# Patient Record
(demographics unavailable — no encounter records)

---

## 2024-10-18 NOTE — ASSESSMENT
[Diabetes Foot Care] : diabetes foot care [Long Term Vascular Complications] : long term vascular complications of diabetes [Carbohydrate Consistent Diet] : carbohydrate consistent diet [Importance of Diet and Exercise] : importance of diet and exercise to improve glycemic control, achieve weight loss and improve cardiovascular health [Exercise/Effect on Glucose] : exercise/effect on glucose [Hypoglycemia Management] : hypoglycemia management [Action and use of Insulin] : action and use of short and long-acting insulin [Self Monitoring of Blood Glucose] : self monitoring of blood glucose [Insulin Self-Administration] : insulin self-administration [Injection Technique, Storage, Sharps Disposal] : injection technique, storage, and sharps disposal [Retinopathy Screening] : Patient was referred to ophthalmology for retinopathy screening [Weight Loss] : weight loss [FreeTextEntry1] : PT WAS EDUCATED ON SIGNIFICANCE OF A1C OF 11.3% ON LONG- AND SHORT-TERM COMPLICATIONS  DISCUSSED GENERAL A1C GOAL OF 7% AND BG GOALS  BEFORE MEALS AND < 180 2 HOURS AFTER MEALS TO HELP REDUCE INCIDENCE OF COMPLICATIONS EXPLAINED THE PATHOPHYSIOLOGY OF TYPE 2 DIABETES AND TREATMENT.  EXPLAINED THAT PEOPLE OFTEN NEED FOR MULTIPLE AGENTS IN ADDITION TO LIFESTYLE CHANGES TO CONTROL BG AND HELP PREVENT COMPLICATIONS.  LABS REVIEWED INDEPTH INCLUDING LOW C-PEPTIDE AND THE NEED FOR INSULIN  PT IN AGREEMENT TO START BASAL INSULIN ONCE DAILY PT INSTRUCTED ON USE OF INSULIN PEN.  DISCUSSED IMPORTANCE OF 2 UNIT "TEST SHOT" BEFORE EVERY INJECTION AND IMPORTANCE OF HOLDING PEN IN PLACE FOR 10 SECONDS BEFORE REMOVING.  EDUCATED ON INSULIN STORAGE AND IMPORTANCE OF SITE ROTATION.   EDUCATED ON SIGNS, SYMPTOMS, PREVENTION AND TREATMENT OF HYPOGLYCEMIA.  EXPLAINED THAT HYPOGLYCEMIA SHOULD NOT BE TOLERATED AND SHOULD BE BROUGHT TO THE ATTENTION OF THE HEALTH CARE PROVIDER PROMPTLY.  INSTRUCTED TO TAKE AT APPROXIMATELY THE SAME TIME EACH DAY.  EXPLAINED THAT THIS IS A STARTING DOSE OF INSULIN AND WILL LIKELY BE INCREASED.  PT WAS ABLE TO SUCCESSFULLY INJECT  10   UNITS OF   TOUJEO  IN ABDOMEN.  TOUJEO SAMPLE GIVEN LOT# [id number] EXP: 3/31/25 PT AWARE TO COMPLETE TOUJEO SAMPLE PRIOR TO TAKING BASAGLAR (OR EQUIVILENT INSTRUCTED TO INCREASE INSULIN BY 2 UNTILS EVERY 5 DAYS UNTIL FASTING BG  < 150  SAMPLE DIANA 3 CGM PLACED ON  RIGHT UPPER ARM LOT# [id number]  SERIAL # [id number]   EXP: 1/31/25 INSTRUCTED TO AVOID MORE THAN 500 MG OF VITAMIN C (PT DOES NOT TAKE) INSTRUCTED TO REMOVE PRIOR TO MRI  EXPLAINED THAT CGM IS TESTING INTERSTITIAL FLUID, RATHER THAN THE CAPILLARY BLOOD MEASURED WHEN PT PERFORMS A FINGERSTICK INSTRUCTED TO VERIFY ANY RESULTS THAT SEEM INACCURATE OR IF HAVING FEELINGS OF LOW BG BY PERFORMING FINGERSTICK EDUCATED ON SIGNS AND SYMPTOMS OF SITE INFECTION AND IMPORTANCE OF REMOVING SENSOR AND CALLING OFFICE ASAP INSTRUCTED TO REMOVE SENSOR AFTER 14 DAYS.  SENSOR MAY BE DISCARDED.   .INSTRUCTED TO CALL OFFICE FOR BG < 70, > 250 OR FOR ANY QUESTIONS OR CONCERNS CALL OFFICE NEXT WEEK TO REVIEW CGM DOWNLOAD EXPLAINED TO PT THAT THIS IS A STARTING DOSE AND WILL LIKELY BE INCREASED AS WE SEE BG READINGS AND RESPONSE TO INSULIN  ENCOURAGED TO SHARE DIABETES DX WITH FAMILY.  MUCH EMOTIONAL SUPPORT PROVIDED  WILL CONSIDER ADDING GLP1 AT NEXT VISIT   PT WAS ABLE TO TEACH BACK ALL INSTRUCTIONS.  WRITTEN, ILLUSTRATED INSTRUCTIONS AND EDUCATIONAL MATERIALS GIVEN. SCHEDULED TO RETURN TO OFFICE ON 11/18/24

## 2024-10-18 NOTE — PHYSICAL EXAM
[Alert] : alert [No Acute Distress] : no acute distress [No Respiratory Distress] : no respiratory distress [Normal Gait] : normal gait [Right foot was examined, including] : right foot ~C was examined, including visual inspection with sensory and pulse exams [Normal] : normal [2+] : 2+ in the dorsalis pedis [Foot Ulcers] : no foot ulcers [Delayed in the Right Toes] : normal in the toes [Delayed in the Left Toes] : normal in the toes [Diminished Throughout Both Feet] : normal tactile sensation with monofilament testing throughout both feet

## 2024-10-18 NOTE — HISTORY OF PRESENT ILLNESS
[FreeTextEntry1] : HERE TODAY FOR DIABETES FOLLOW UP - LAST SEEN BY ME 11/27/27/24 FEELS WELL REPORTS POLYURIA (LESS IN LAST 2 DAYS SINCE TRYING TO IMPROVED DIET)   NO UNINTENTIONAL WEIGHT LOSS A1C 11.3% 10/6/24 HX GDM TYPE 2 DIABETES x 5 YEARS.  INITIALLY PRESENTED WITH A1C OF 14% , WENT ON INSULIN AND METFORMIN AND CAME DOWN TO 6.2% WITH INSULIN AND LIFESTYLE CHANGES. NOT SEEN AND NO LABS X 2 YEARS AFTER THAT.  4/14/23 .  A1C 12%   .REPORTS COMPLIANCE W/ METFORMIN 1,000 MG BEFORE BREAKFAST AND DINNER TOLERATING JARDIANCE. DENIES SIGN/SYMPTOMS OF UTI OR GENITAL INFECTION. 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

## 2024-10-18 NOTE — REVIEW OF SYSTEMS
[Polyuria] : polyuria [Polydipsia] : polydipsia [Fatigue] : no fatigue [Blurred Vision] : no blurred vision [Dysphagia] : no dysphagia [Shortness Of Breath] : no shortness of breath [Nausea] : no nausea [Diarrhea] : no diarrhea [Gas/Bloating] : no gas/bloating

## 2024-11-18 NOTE — ASSESSMENT
[Diabetes Foot Care] : diabetes foot care [Long Term Vascular Complications] : long term vascular complications of diabetes [Carbohydrate Consistent Diet] : carbohydrate consistent diet [Importance of Diet and Exercise] : importance of diet and exercise to improve glycemic control, achieve weight loss and improve cardiovascular health [Exercise/Effect on Glucose] : exercise/effect on glucose [Hypoglycemia Management] : hypoglycemia management [Retinopathy Screening] : Patient was referred to ophthalmology for retinopathy screening [Weight Loss] : weight loss [FreeTextEntry1] : BG MUCH IMPROVED DISCUSSED GENERAL A1C GOAL OF 7% AND BG GOALS  BEFORE MEALS AND < 180 2 HOURS AFTER MEALS TO HELP REDUCE INCIDENCE OF COMPLICATIONS  DISCUSSED WORKING PT UP FOR BOOGIE GIVEN SENSITIVITY TO FOODS, LOW C-PEPTIDE, BGELEVATIONS WITH EXERCISE.  PT IN AGREEMENT TO OBTAIN LABS THIS WEEK  LENGTHY DISCUSSION ON TIPS AND TECHNIQUES FOR CHOOSING SMALLER PORTIONS AND CHOOSING HIGHER QUALITY CARBOHYDRATES AND LEAN PROTEINS.  USE OF MEASURING CUPS, SMALLER PLATES AND EATING OUT.  HOLIDAY STRATEGIES DISCUSSED AND PLANNED  PT IN AGREEMENT TO SEE OPHTHALMOLOGY FOR ANNUAL EXAM  WILL CALL WHEN LAB RESULTED AND MAKE PLAN - IF NEGATIVE WILL CONSIDER GLP1  .INSTRUCTED TO CALL OFFICE FOR BG < 70, > 250 OR FOR ANY QUESTIONS, CONCERNS OR DIFFICULTY OBTAINING MEDICATION OR SUPPLIES

## 2024-11-18 NOTE — PHYSICAL EXAM
[Alert] : alert [No Respiratory Distress] : no respiratory distress [Normal Gait] : normal gait [Oriented x3] : oriented to person, place, and time [Normal Affect] : the affect was normal [Recent Memory Normal] : recent memory was not impaired [Normal Insight/Judgement] : insight and judgment were intact [Normal Mood] : the mood was normal [Remote Memory Normal] : remote memory was not impaired [Foot Ulcers] : no foot ulcers

## 2024-11-18 NOTE — HISTORY OF PRESENT ILLNESS
[FreeTextEntry1] : HERE TODAY FOR DIABETES FOLLOW UP FEELS WELL DENIES POLYURIA, POLYDIPSIA OR UNINTENTIONAL WEIGHT LOSS A1C 11.3% 10/6/24 PT SHARED DIABETES DX WITH FAMILY. FAMILY SUPPORTIVE .REPORTS COMPLIANCE W/ METFORMIN 1,000 MG WITH BREAKFAST AND 9 PM TOLERATING JARDIANCE 10 MG DAILY.  DENIES SIGN/SYMPTOMS OF UTI , GENITAL INFECTION OR DIZZINESS REPORTS COMPLIANCE W/ BASAGLAR  18 UNITS DAILY CGM DOWNLOAD BG AVERAGE  WORN FOR 14 DAYS BG             < 54         0%          54-69      0%              79%         181-250   18%          > 250       3% GLUCOSE VARIABILITY 30.9% (TARGET LESS THAN OR EQUAL TO 36%) GOES TO THE GYM 5 DAYS PER WEEK FOR 45- 120 MINUTES EATING REASONABLE DIET DENIES MISSING MEDS OVERDUE FOR EYE EXAM - AGREED TO MAKE APPT

## 2025-01-10 NOTE — PHYSICAL EXAM
[Alert] : alert [No Acute Distress] : no acute distress [Normal Gait] : normal gait [Oriented x3] : oriented to person, place, and time [Normal Affect] : the affect was normal [Normal Mood] : the mood was normal [Foot Ulcers] : no foot ulcers

## 2025-01-10 NOTE — ASSESSMENT
[Long Term Vascular Complications] : long term vascular complications of diabetes [Carbohydrate Consistent Diet] : carbohydrate consistent diet [Importance of Diet and Exercise] : importance of diet and exercise to improve glycemic control, achieve weight loss and improve cardiovascular health [Exercise/Effect on Glucose] : exercise/effect on glucose [Hypoglycemia Management] : hypoglycemia management [Injection Technique, Storage, Sharps Disposal] : injection technique, storage, and sharps disposal [Retinopathy Screening] : Patient was referred to ophthalmology for retinopathy screening [FreeTextEntry1] : A1C TODAY 8.5% DOWN FROM 11.3%.  CONGRATULATED ON EFFORTS TO IMPROVED GLUCOSE CONTROL DISCUSSED GENERAL A1C GOAL OF 7% AND BG GOALS  BEFORE MEALS AND < 180 2 HOURS AFTER MEALS TO HELP REDUCE INCIDENCE OF COMPLICATIONS  EXPLAINED THE PATHOPHYSIOLOGY OF TYPE 2 DIABETES AND TREATMENT.  EXPLAINED THAT PEOPLE OFTEN NEED FOR MULTIPLE AGENTS IN ADDITION TO LIFESTYLE CHANGES TO CONTROL BG AND HELP PREVENT COMPLICATIONS. PT IN AGREEMENT TO START GLP1/GIP INSTRUCTED ON USE OF MOUNJARO.    WILL START 2.5 MG WEEKLY x 4 WEEKS.  IF TOLERATING, WILL INCREASE TO 5 MG AFTER 4 WEEKS IF AVIALABLE DENIES ANY PERSONAL OR FAMILY HX OF THYROID CANCER OR PANCREATITIS INSTRUCTED TO STORE PENS IN [place].  RECOMMEND THE DAY BEFORE OR DAY OF TO HAVE AT ROOM TEMP EXPLAINED THERE MAY BE SOME NAUSEA ASSOCIATED W/ THIS MEDICATION BUT USUALLY LESSENS IN TIME EDUCATED ON POTENTIAL SIDE EFFECTS.   INSTRUCTED TO REPORT ANY LUMP OR SWELLING IN THE NECK, HOARSENESS, TROUBLE SWALLOWING OR SHORTNESS OF BREATH, INTOLERABLE GI SIDE EFFECTS OR ABDOMINAL PAIN.  EDUCATED ON IMPORTANCE OF ADEQUATE HYDRATION AND NOT TAKING TRULICITY IF UNABLE TO EAT OR DRINK INSTRUCTED ON IMPORTANCE OF STOPPING MEDICATION  AND CALLING MD PROMPTLY .  INSTRUCTED TO HOLD AT LEAST 1 WEEK PRIOR TO ANY PROCEDURES REQUIRING DEEP SEDATION OR ANESTHESIA PT WAS ABLE TO SUCCESSFULLY INJECT DEMO PEN INTO DEMO PILLOW.   CONTINUE BASAGLAR 20 UNITS DAILY, METFORMIN AND JARDIANCE CALL OFFICE IF BG TRENDS UNDER 100 AS INSULIN DOSE WILL BE REDUCED  LENGTHY DISCUSSION ON TIPS AND TECHNIQUES FOR CHOOSING SMALLER PORTIONS AND CHOOSING HIGHER QUALITY CARBOHYDRATES AND LEAN PROTEINS.  USE OF MEASURING CUPS, SMALLER PLATES AND EATING OUT. RESUME EXERCISE REGIME AGREED TO CALL OFFICE IN 3 WEEKS TO DISCUSS MOUNJARO DOSING .INSTRUCTED TO CALL OFFICE FOR BG < 70, > 250 OR FOR ANY QUESTIONS, CONCERNS OR DIFFICULTY OBTAINING MEDICATION OR SUPPLIES WAS ABLE TO TEACH BACK ALL INSTRUCTIONS SCHEDULED TO RETURN TO OFFICE ON

## 2025-01-10 NOTE — HISTORY OF PRESENT ILLNESS
[FreeTextEntry1] : HERE TODAY FOR DIABETES FOLLOW UP FEELS WELL DENIES POLYURIA, POLYDIPSIA OR UNINTENTIONAL WEIGHT LOSS A1C 11.3% 10/6/24 LIVES W/ FAMILY. FAMILY SUPPORTIVE .REPORTS COMPLIANCE W/ METFORMIN 1,000 MG WITH BREAKFAST AND 9 PM TOLERATING JARDIANCE 10 MG DAILY. DENIES SIGN/SYMPTOMS OF UTI , GENITAL INFECTION OR DIZZINESS REPORTS COMPLIANCE W/ BASAGLAR 20 UNITS DAILY - PT DID NOT UP TITRATE INSULIN AS BG PJ CGM DOWNLOAD BG AVERAGE 202 WORN FOR 14 DAYS BG  < 54  0%  54-69 0%   33% DOWN FROM 79%  181-250 52% UP FROM 18%  > 250 15% UP FROM 3% GLUCOSE VARIABILITY 23.7% (TARGET LESS THAN OR EQUAL TO 36%) HAS NOT BEEN TO THE GYM AS OFTEN AS SHE HAD BEEN  DENIES MISSING MEDS HOLIDAYS WERE A CHALLENGE FOR EATING WELL BALANCED DIET DRINKS ADEQUATE WATER OVERDUE FOR EYE EXAM - AGREED TO MAKE APPT OVERDUE FOR EYE EXAM - AGREED TO MAKE APPT

## 2025-01-12 NOTE — ASSESSMENT
[FreeTextEntry1] : A1C TODAY 8.5% DOWN FROM 11.3%.  CONGRATULATED ON EFFORTS TO IMPROVED GLUCOSE CONTROL DISCUSSED GENERAL A1C GOAL OF 7% AND BG GOALS  BEFORE MEALS AND < 180 2 HOURS AFTER MEALS TO HELP REDUCE INCIDENCE OF COMPLICATIONS  EXPLAINED THE PATHOPHYSIOLOGY OF TYPE 2 DIABETES AND TREATMENT.  EXPLAINED THAT PEOPLE OFTEN NEED FOR MULTIPLE AGENTS IN ADDITION TO LIFESTYLE CHANGES TO CONTROL BG AND HELP PREVENT COMPLICATIONS. PT IN AGREEMENT TO START GLP1/GIP INSTRUCTED ON USE OF MOUNJARO.    WILL START 2.5 MG WEEKLY x 4 WEEKS.  IF TOLERATING, WILL INCREASE TO 5 MG AFTER 4 WEEKS IF AVIALABLE DENIES ANY PERSONAL OR FAMILY HX OF THYROID CANCER OR PANCREATITIS INSTRUCTED TO STORE PENS IN [place].  RECOMMEND THE DAY BEFORE OR DAY OF TO HAVE AT ROOM TEMP EXPLAINED THERE MAY BE SOME NAUSEA ASSOCIATED W/ THIS MEDICATION BUT USUALLY LESSENS IN TIME EDUCATED ON POTENTIAL SIDE EFFECTS.   INSTRUCTED TO REPORT ANY LUMP OR SWELLING IN THE NECK, HOARSENESS, TROUBLE SWALLOWING OR SHORTNESS OF BREATH, INTOLERABLE GI SIDE EFFECTS OR ABDOMINAL PAIN.  EDUCATED ON IMPORTANCE OF ADEQUATE HYDRATION AND NOT TAKING TRULICITY IF UNABLE TO EAT OR DRINK INSTRUCTED ON IMPORTANCE OF STOPPING MEDICATION  AND CALLING MD PROMPTLY .  INSTRUCTED TO HOLD AT LEAST 1 WEEK PRIOR TO ANY PROCEDURES REQUIRING DEEP SEDATION OR ANESTHESIA PT WAS ABLE TO SUCCESSFULLY INJECT DEMO PEN INTO DEMO PILLOW.   CONTINUE BASAGLAR 20 UNITS DAILY, METFORMIN AND JARDIANCE CALL OFFICE IF BG TRENDS UNDER 100 AS INSULIN DOSE WILL BE REDUCED  LENGTHY DISCUSSION ON TIPS AND TECHNIQUES FOR CHOOSING SMALLER PORTIONS AND CHOOSING HIGHER QUALITY CARBOHYDRATES AND LEAN PROTEINS.  USE OF MEASURING CUPS, SMALLER PLATES AND EATING OUT. RESUME EXERCISE REGIME AGREED TO CALL OFFICE IN 3 WEEKS TO DISCUSS MOUNJARO DOSING .INSTRUCTED TO CALL OFFICE FOR BG < 70, > 250 OR FOR ANY QUESTIONS, CONCERNS OR DIFFICULTY OBTAINING MEDICATION OR SUPPLIES WAS ABLE TO TEACH BACK ALL INSTRUCTIONS SCHEDULED TO RETURN TO OFFICE ON  I AM PROVIDING CONTINUOUS CARE FOR THIS PATIENT THAT INCLUDES TESTING, DISCUSSION OF OPTIONS FOR TREATMENT, AND SHARED DECISION MAKING WITH REGARD TO CHOSEN TREATMENT.

## 2025-04-14 NOTE — PHYSICAL EXAM
[Alert] : alert [No Acute Distress] : no acute distress [No Respiratory Distress] : no respiratory distress [Normal Gait] : normal gait [Oriented x3] : oriented to person, place, and time [Normal Affect] : the affect was normal [Normal Insight/Judgement] : insight and judgment were intact [Normal Mood] : the mood was normal [Foot Ulcers] : no foot ulcers

## 2025-04-14 NOTE — REVIEW OF SYSTEMS
[Fatigue] : no fatigue [Dysphagia] : no dysphagia [Shortness Of Breath] : no shortness of breath [Nausea] : no nausea [Diarrhea] : no diarrhea [Gas/Bloating] : no gas/bloating [Polyuria] : no polyuria [Polydipsia] : no polydipsia

## 2025-04-14 NOTE — ASSESSMENT
[Diabetes Foot Care] : diabetes foot care [Long Term Vascular Complications] : long term vascular complications of diabetes [Carbohydrate Consistent Diet] : carbohydrate consistent diet [Importance of Diet and Exercise] : importance of diet and exercise to improve glycemic control, achieve weight loss and improve cardiovascular health [Exercise/Effect on Glucose] : exercise/effect on glucose [Injection Technique, Storage, Sharps Disposal] : injection technique, storage, and sharps disposal [Retinopathy Screening] : Patient was referred to ophthalmology for retinopathy screening [FreeTextEntry1] : A1C TODAY 6% - PT AT A1C GOAL  PT CONGRATULATED ON EFFORTS TO IMPROVE DIABETES CONTROL DISCUSSED GENERAL A1C GOAL OF 7% AND BG GOALS  BEFORE MEALS AND < 180 2 HOURS AFTER MEALS TO HELP REDUCE INCIDENCE OF COMPLICATIONS  DISCUSSED NEXT STEPS INCREASE MOUNJARO FROM 5 TO 7.5 MG WEEKLY DECREASE BASAGLAR FROM 14 TO 6 UNITS DAILY CONTINUE JARDIANCE AND METFORMIN .INSTRUCTED TO CALL OFFICE FOR BG < 70, > 250, MEDICATION SIDE EFFECTS, FOR ANY QUESTIONS, CONCERNS OR DIFFICULTY OBTAINING MEDICATION OR SUPPLIES  SCHEDULED TO RETURN TO OFFICE ON 7/15/25  I AM PROVIDING CONTINUOUS CARE FOR THIS PATIENT THAT INCLUDES TESTING, DISCUSSION OF OPTIONS FOR TREATMENT, AND SHARED DECISION MAKING WITH REGARD TO CHOSEN TREATMENT.

## 2025-04-14 NOTE — HISTORY OF PRESENT ILLNESS
[FreeTextEntry1] : HERE TODAY FOR DIABETES FOLLOW UP FEELS WELL.  GOOD ENERGY DENIES POLYURIA, POLYDIPSIA OR UNINTENTIONAL WEIGHT LOSS A1C 8.5% 1/10/25, 11.3% 10/6/24 PT SHARED DIABETES DX WITH FAMILY. FAMILY SUPPORTIVE .REPORTS COMPLIANCE W/ METFORMIN 1,000 MG WITH BREAKFAST AND 9 PM TOLERATING JARDIANCE 10 MG DAILY. DENIES SIGN/SYMPTOMS OF UTI , GENITAL INFECTION OR DIZZINESS TOLERATING MOUNJARO 5 MG WEEKLY.  DENIES NAUSEA, VOMITING OR ABDOMINAL PAIN.  PT INTERESTED IN INCREASING DOSE OF MOUNJARO REPORTS COMPLIANCE W/ BASAGLAR 14 UNITS DAILY CGM DOWNLOAD 3/25 - 4/14/25 BG AVERAGE 127 WORN FOR 21 DAYS BG  < 54  0%  54-69 0%   98% IMPROVED FROM 79%  181-250 2% IMPROVED FRPM18%  > 250 0% IMPROVED FROM 3% GLUCOSE VARIABILITY 17.1% IMPROVED FROM 30.9% (TARGET LESS THAN OR EQUAL TO 36%) GOES TO THE GYM 5 DAYS PER WEEK FOR 45- 120 MINUTES EATING REASONABLE DIET DENIES MISSING MEDS OVERDUE FOR EYE EXAM - AGREED TO MAKE APPT HAS CPE W/ DR CAMPOS THIS WEEK

## 2025-04-16 NOTE — ASSESSMENT
[Vaccines Reviewed] : Immunizations reviewed today. Please see immunization details in the vaccine log within the immunization flowsheet.  [FreeTextEntry1] : Continue all current medications. Continue to follow with diabetes educator

## 2025-04-16 NOTE — HEALTH RISK ASSESSMENT
[Good] : ~his/her~  mood as  good [No] : In the past 12 months have you used drugs other than those required for medical reasons? No [No falls in past year] : Patient reported no falls in the past year [0] : 2) Feeling down, depressed, or hopeless: Not at all (0) [Former] : Former [20 or more] : 20 or more [NO] : No [Patient reported PAP Smear was normal] : Patient reported PAP Smear was normal [HIV test declined] : HIV test declined [Hepatitis C test declined] : Hepatitis C test declined [With Family] : lives with family [Employed] : employed [] :  [Sexually Active] : sexually active [Feels Safe at Home] : Feels safe at home [Fully functional (bathing, dressing, toileting, transferring, walking, feeding)] : Fully functional (bathing, dressing, toileting, transferring, walking, feeding) [Fully functional (using the telephone, shopping, preparing meals, housekeeping, doing laundry, using] : Fully functional and needs no help or supervision to perform IADLs (using the telephone, shopping, preparing meals, housekeeping, doing laundry, using transportation, managing medications and managing finances) [Reports normal functional visual acuity (ie: able to read med bottle)] : Reports normal functional visual acuity [Reports changes in dental health] : Reports changes in dental health [Safety elements used in home] : safety elements used in home [Seat Belt] :  uses seat belt [With Patient/Caregiver] : , with patient/caregiver [Designated Healthcare Proxy] : Designated healthcare proxy [Relationship: ___] : Relationship: [unfilled] [I will adhere to the patient's wishes.] : I will adhere to the patient's wishes. 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

## 2025-04-16 NOTE — HISTORY OF PRESENT ILLNESS
[FreeTextEntry1] : CPE [de-identified] : 58 yo F PMH DM2 controlled on here for CPE. She is feeling well. Follows with Mila Finn for DM education and treatment, A1C now at 6. Exercises 5-6 days/week. 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 Had her mammo yesterday. Last pap smear in fall of 2024, normal colonoscopy 6 years ago. She was told that she should repeat in 5 years. However, she learned that there was a change in guidelines and opted to not return until7-10 years had passed. She made this decision without speaking with GI.  DM meds: jardiance 10 mg daily mounjaro7 mg weekly basaglar 6 units starting tomorrow metformin 1000 mg BID atorvastatin: 20 mg daily

## 2025-04-16 NOTE — PHYSICAL EXAM
[No Acute Distress] : no acute distress [Normal Outer Ear/Nose] : the outer ears and nose were normal in appearance [Normal Oropharynx] : the oropharynx was normal [No Lymphadenopathy] : no lymphadenopathy [Thyroid Normal, No Nodules] : the thyroid was normal and there were no nodules present [No Edema] : there was no peripheral edema [Normal] : soft, non-tender, non-distended, no masses palpated, no HSM and normal bowel sounds [Normal Posterior Cervical Nodes] : no posterior cervical lymphadenopathy [Normal Anterior Cervical Nodes] : no anterior cervical lymphadenopathy [No Focal Deficits] : no focal deficits [Normal Affect] : the affect was normal [Normal Insight/Judgement] : insight and judgment were intact

## 2025-07-15 NOTE — PHYSICAL EXAM
[Alert] : alert [No Acute Distress] : no acute distress [No Respiratory Distress] : no respiratory distress [Normal Gait] : normal gait [Foot Ulcers] : no foot ulcers [Right foot was examined, including] : right foot ~C was examined, including visual inspection with sensory and pulse exams [Left foot was examined, including] : left foot ~C was examined, including visual inspection with sensory and pulse exams [Normal] : normal [2+] : 2+ in the dorsalis pedis [Diminished Throughout Both Feet] : normal tactile sensation with monofilament testing throughout both feet [Oriented x3] : oriented to person, place, and time [Normal Affect] : the affect was normal [Normal Mood] : the mood was normal

## 2025-07-15 NOTE — ASSESSMENT
[Diabetes Foot Care] : diabetes foot care [Long Term Vascular Complications] : long term vascular complications of diabetes [Carbohydrate Consistent Diet] : carbohydrate consistent diet [Importance of Diet and Exercise] : importance of diet and exercise to improve glycemic control, achieve weight loss and improve cardiovascular health [Exercise/Effect on Glucose] : exercise/effect on glucose [Injection Technique, Storage, Sharps Disposal] : injection technique, storage, and sharps disposal [Retinopathy Screening] : Patient was referred to ophthalmology for retinopathy screening [FreeTextEntry1] : A1C TODAY 5.9% PT CONGRATULATED ON ALL EFFORTS TO BETTER MANAGE DIABETES DISCUSSED GENERAL A1C GOAL OF 7% AND BG GOALS  BEFORE MEALS AND < 180 2 HOURS AFTER MEALS TO HELP REDUCE INCIDENCE OF COMPLICATIONS   CGM DOWNLOAD REVIEWED W/ PT  INSTRUCTED TO STOP BASAGLAR CONTINUE MOUNJARO 7.5 MG WEEKLY, JARDIANCE 10 MG DAILY AND METFORMIN 1,000 MG DISCUSSED REDUCING MOUNJARO DOSE OR POSSIBLY STOPPING JARDIANCE PT REQUESTED TO KEEP ALL MEDS THE SAME FOR NOW.  WILL CONSIDER STOPPING JARDIANCE IN THE COMING MONTHS LENGTHY DISCUSSION ON TIPS AND TECHNIQUES FOR CHOOSING SMALLER PORTIONS AND CHOOSING HIGHER QUALITY CARBOHYDRATES AND LEAN PROTEINS.  USE OF MEASURING CUPS, SMALLER PLATES AND EATING OUT. EDUCATED ON IMPORTANCE AND PURPOSE OF ANNUAL EYE EXAM TO SCREEN FOR DIABETIC RETINOPATHY AND OTHER EYE PROBLEMS.  EXPLAINED THAT WHEN PATIENTS HAVE DIABETIC  RETINOPATHY, THERE ARE OFTEN THERE NO CHANGES IN VISION THAT WOULD SIGNIFY A PROBLEM UNTIL THE PROBLEM IS FAR ADVANCED.  IF DIABETIC RETINOPATHY IS ADDRESSED EARLY, THERE ARE EFFECTIVE TREATMENTS AVAILABLE.  UNADDRESSED RETINOPATHY CAN LEAD TO PERMANENT VISION LOSS.   .INSTRUCTED TO CALL OFFICE FOR BG < 70, > 250 OR FOR ANY QUESTIONS, CONCERNS OR DIFFICULTY OBTAINING MEDICATION OR SUPPLIES   SCHEDULED TO RETURN TO OFFICE ON  1/13/26  I AM PROVIDING CONTINUOUS CARE FOR THIS PATIENT THAT INCLUDES TESTING, DISCUSSION OF OPTIONS FOR TREATMENT, AND SHARED DECISION MAKING WITH REGARD TO CHOSEN TREATMENT.

## 2025-07-15 NOTE — REVIEW OF SYSTEMS
[Fatigue] : no fatigue [Blurred Vision] : no blurred vision [Shortness Of Breath] : no shortness of breath [Nausea] : no nausea [Constipation] : no constipation [Vomiting] : no vomiting [Diarrhea] : no diarrhea [Gas/Bloating] : no gas/bloating [Polyuria] : no polyuria [Polydipsia] : no polydipsia

## 2025-07-15 NOTE — QUALITY MEASURES
----- Message from Marta Garrison MD sent at 1/25/2024  4:25 PM EST -----  Flex 24  thanks  ----- Message -----  From: Sangita Mack AuD  Sent: 1/25/2024   9:39 AM EST  To: Marta Garrison MD; Juan Manuel Magana; #    What electrode array would you prefer that we order for patient? Thank you.    Electronically signed by Juan Manuel Peters on 1/25/2024 at 9:38 AM          
[Visual inspection, sensory exam] : Foot exam, including visual inspection, sensory exam with mono filament, and pulse exam, was performed within the last 12 months